# Patient Record
Sex: FEMALE | Race: WHITE
[De-identification: names, ages, dates, MRNs, and addresses within clinical notes are randomized per-mention and may not be internally consistent; named-entity substitution may affect disease eponyms.]

---

## 2006-09-20 VITALS — HEART RATE: 62 BPM

## 2017-01-23 ENCOUNTER — HOSPITAL ENCOUNTER (OUTPATIENT)
Dept: HOSPITAL 19 - COL.LAB | Age: 67
End: 2017-01-23
Attending: ORTHOPAEDIC SURGERY
Payer: COMMERCIAL

## 2017-01-23 DIAGNOSIS — Z01.812: Primary | ICD-10-CM

## 2017-01-23 DIAGNOSIS — M16.12: ICD-10-CM

## 2017-08-18 ENCOUNTER — HOSPITAL ENCOUNTER (INPATIENT)
Dept: HOSPITAL 19 - COL.ER | Age: 67
LOS: 4 days | Discharge: HOME | DRG: 189 | End: 2017-08-22
Attending: FAMILY MEDICINE | Admitting: FAMILY MEDICINE
Payer: COMMERCIAL

## 2017-08-18 VITALS — BODY MASS INDEX: 19.14 KG/M2 | HEIGHT: 65 IN | WEIGHT: 114.86 LBS

## 2017-08-18 VITALS — DIASTOLIC BLOOD PRESSURE: 59 MMHG | SYSTOLIC BLOOD PRESSURE: 142 MMHG | TEMPERATURE: 99.9 F | HEART RATE: 101 BPM

## 2017-08-18 VITALS — DIASTOLIC BLOOD PRESSURE: 68 MMHG | HEART RATE: 98 BPM | TEMPERATURE: 101.7 F | SYSTOLIC BLOOD PRESSURE: 137 MMHG

## 2017-08-18 VITALS — SYSTOLIC BLOOD PRESSURE: 136 MMHG | HEART RATE: 105 BPM | DIASTOLIC BLOOD PRESSURE: 58 MMHG | TEMPERATURE: 101.1 F

## 2017-08-18 DIAGNOSIS — F17.210: ICD-10-CM

## 2017-08-18 DIAGNOSIS — J96.01: Primary | ICD-10-CM

## 2017-08-18 DIAGNOSIS — F10.239: ICD-10-CM

## 2017-08-18 DIAGNOSIS — E87.3: ICD-10-CM

## 2017-08-18 DIAGNOSIS — R91.8: ICD-10-CM

## 2017-08-18 DIAGNOSIS — J44.1: ICD-10-CM

## 2017-08-18 LAB
ADD PATHOLOGY DIFF REVIEW: NO
ADJUSTED CALCIUM: 9.5 MG/DL (ref 8.4–10.2)
ALBUMIN SERPL-MCNC: 4.3 GM/DL (ref 3.5–5)
ALP SERPL-CCNC: 378 U/L (ref 50–136)
ALT SERPL-CCNC: 35 U/L (ref 9–52)
AMPHET UR-MCNC: NEGATIVE NG/ML
ANION GAP SERPL CALC-SCNC: 12 MMOL/L (ref 7–16)
ARTERIAL PATENCY WRIST A: NO
ATS?: YES
BARBITURATES UR SCN-MCNC: NEGATIVE NG/ML
BASE EXCESS BLDA CALC-SCNC: -2 MMOL/L (ref -2–2)
BENZODIAZ UR-MCNC: POSITIVE NG/ML
BILIRUB SERPL-MCNC: 0.6 MG/DL (ref 0–1)
BUN SERPL-MCNC: 9 MG/DL (ref 7–17)
BUPRENORPHINE UR-MCNC: NEGATIVE NG/ML
CALCIUM SERPL-MCNC: 9.7 MG/DL (ref 8.4–10.2)
CHLORIDE SERPL-SCNC: 105 MMOL/L (ref 98–107)
CO2 BLDA-SCNC: 21.3 MMOL/L
CO2 SERPL-SCNC: 19 MMOL/L (ref 22–30)
CREAT SERPL-SCNC: 0.73 MG/DL (ref 0.52–1.25)
CRP SERPL-MCNC: 2.1 MG/DL (ref 0–0.9)
EOSINOPHIL NFR BLD: 5 % (ref 0–4)
ERYTHROCYTE [DISTWIDTH] IN BLOOD BY AUTOMATED COUNT: 15.4 % (ref 11.5–14.5)
GLUCOSE SERPL-MCNC: 111 MG/DL (ref 74–106)
HCO3 BLDA-SCNC: 20.5 MEQ/L (ref 22–26)
HCT VFR BLD AUTO: 39.8 % (ref 37–47)
HGB BLD-MCNC: 13.3 G/DL (ref 12.5–16)
INHALED O2 CONCENTRATION: 21 %
KETONES UR STRIP.AUTO-MCNC: (no result) MG/DL
MCH RBC QN AUTO: 30 PG (ref 27–31)
MCHC RBC AUTO-ENTMCNC: 33 G/DL (ref 33–37)
MCV RBC AUTO: 91 FL (ref 80–100)
METHADONE UR-MCNC: NEGATIVE NG/ML
MONOCYTES NFR BLD: 12 % (ref 1.7–9.3)
NEUTS BAND NFR BLD: 2 % (ref 0–10)
NEUTS SEG NFR BLD MANUAL: 58 % (ref 42–75.2)
OPIATES UR-MCNC: NEGATIVE NG/ML
OXYCODONE UR CFM-MCNC: NEGATIVE NG/ML
OXYHGB MFR BLD: 90 %
PCP UR-MCNC: NEGATIVE NG/ML
PH UR STRIP.AUTO: 6 [PH] (ref 5–8)
PLATELET # BLD AUTO: 374 K/MM3 (ref 130–400)
PLATELET BLD QL SMEAR: NORMAL
PMV BLD AUTO: 9.8 FL (ref 7.4–10.4)
PO2 BLDA: 56.7 MMHG (ref 80–100)
POTASSIUM SERPL-SCNC: 4.4 MMOL/L (ref 3.4–5)
PROPOXYPH UR-MCNC: NEGATIVE NG/ML
PROT SERPL-MCNC: 8.3 GM/DL (ref 6.4–8.2)
RBC # BLD AUTO: 4.4 M/MM3 (ref 4.1–5.3)
RBC # UR STRIP.AUTO: (no result) /UL
RBC # UR: (no result) /HPF
SAO2 % BLDA: 90.9 % (ref 92–100)
SODIUM SERPL-SCNC: 136 MMOL/L (ref 137–145)
SP GR UR STRIP.AUTO: 1.03 (ref 1–1.03)
THC CANNABINOIDS URINE: NEGATIVE NG/ML
TOTAL CELLS COUNTED BLD: 100
WBC # BLD AUTO: 12.8 K/MM3 (ref 4.8–10.8)
WBC #/AREA URNS HPF: (no result) /HPF

## 2017-08-18 PROCEDURE — A9585 GADOBUTROL INJECTION: HCPCS

## 2017-08-19 VITALS — SYSTOLIC BLOOD PRESSURE: 113 MMHG | HEART RATE: 85 BPM | DIASTOLIC BLOOD PRESSURE: 52 MMHG | TEMPERATURE: 98.8 F

## 2017-08-19 VITALS — SYSTOLIC BLOOD PRESSURE: 133 MMHG | DIASTOLIC BLOOD PRESSURE: 60 MMHG | HEART RATE: 101 BPM

## 2017-08-19 VITALS — SYSTOLIC BLOOD PRESSURE: 108 MMHG | HEART RATE: 93 BPM | TEMPERATURE: 99.9 F | DIASTOLIC BLOOD PRESSURE: 60 MMHG

## 2017-08-19 VITALS — TEMPERATURE: 101.4 F | SYSTOLIC BLOOD PRESSURE: 111 MMHG | HEART RATE: 83 BPM | DIASTOLIC BLOOD PRESSURE: 47 MMHG

## 2017-08-19 VITALS — HEART RATE: 74 BPM | TEMPERATURE: 97.8 F | DIASTOLIC BLOOD PRESSURE: 58 MMHG | SYSTOLIC BLOOD PRESSURE: 121 MMHG

## 2017-08-19 VITALS — DIASTOLIC BLOOD PRESSURE: 57 MMHG | TEMPERATURE: 98.6 F | SYSTOLIC BLOOD PRESSURE: 122 MMHG | HEART RATE: 86 BPM

## 2017-08-19 VITALS — SYSTOLIC BLOOD PRESSURE: 118 MMHG | HEART RATE: 73 BPM | DIASTOLIC BLOOD PRESSURE: 56 MMHG | TEMPERATURE: 98.5 F

## 2017-08-19 LAB
ADJUSTED CALCIUM: 9.3 MG/DL (ref 8.4–10.2)
ALBUMIN SERPL-MCNC: 3.8 GM/DL (ref 3.5–5)
ALP SERPL-CCNC: 259 U/L (ref 50–136)
ALT SERPL-CCNC: 31 U/L (ref 9–52)
ANION GAP SERPL CALC-SCNC: 12 MMOL/L (ref 7–16)
BILIRUB SERPL-MCNC: 0.5 MG/DL (ref 0–1)
BUN SERPL-MCNC: 11 MG/DL (ref 7–17)
CALCIUM SERPL-MCNC: 9.1 MG/DL (ref 8.4–10.2)
CHLORIDE SERPL-SCNC: 106 MMOL/L (ref 98–107)
CO2 SERPL-SCNC: 21 MMOL/L (ref 22–30)
CREAT SERPL-SCNC: 0.73 MG/DL (ref 0.52–1.25)
GLUCOSE SERPL-MCNC: 141 MG/DL (ref 74–106)
MAGNESIUM SERPL-MCNC: 2.1 MG/DL (ref 1.6–2.3)
POTASSIUM SERPL-SCNC: 4 MMOL/L (ref 3.4–5)
PROT SERPL-MCNC: 7.3 GM/DL (ref 6.4–8.2)
SODIUM SERPL-SCNC: 138 MMOL/L (ref 137–145)

## 2017-08-20 VITALS — HEART RATE: 77 BPM | SYSTOLIC BLOOD PRESSURE: 110 MMHG | TEMPERATURE: 97.5 F | DIASTOLIC BLOOD PRESSURE: 47 MMHG

## 2017-08-20 VITALS — DIASTOLIC BLOOD PRESSURE: 58 MMHG | SYSTOLIC BLOOD PRESSURE: 120 MMHG | TEMPERATURE: 97.8 F | HEART RATE: 72 BPM

## 2017-08-20 VITALS — TEMPERATURE: 97.5 F | SYSTOLIC BLOOD PRESSURE: 117 MMHG | HEART RATE: 73 BPM | DIASTOLIC BLOOD PRESSURE: 52 MMHG

## 2017-08-20 VITALS — TEMPERATURE: 98.8 F | HEART RATE: 72 BPM | SYSTOLIC BLOOD PRESSURE: 143 MMHG | DIASTOLIC BLOOD PRESSURE: 61 MMHG

## 2017-08-20 VITALS — SYSTOLIC BLOOD PRESSURE: 124 MMHG | TEMPERATURE: 97.8 F | HEART RATE: 65 BPM | DIASTOLIC BLOOD PRESSURE: 59 MMHG

## 2017-08-20 VITALS — SYSTOLIC BLOOD PRESSURE: 117 MMHG | TEMPERATURE: 97.6 F | HEART RATE: 72 BPM | DIASTOLIC BLOOD PRESSURE: 60 MMHG

## 2017-08-20 VITALS — TEMPERATURE: 97.8 F | DIASTOLIC BLOOD PRESSURE: 59 MMHG | HEART RATE: 65 BPM | SYSTOLIC BLOOD PRESSURE: 114 MMHG

## 2017-08-20 LAB
ADD PATHOLOGY DIFF REVIEW: NO
ERYTHROCYTE [DISTWIDTH] IN BLOOD BY AUTOMATED COUNT: 16.1 % (ref 11.5–14.5)
HCT VFR BLD AUTO: 32 % (ref 37–47)
HGB BLD-MCNC: 10.5 G/DL (ref 12.5–16)
MCH RBC QN AUTO: 30 PG (ref 27–31)
MCHC RBC AUTO-ENTMCNC: 33 G/DL (ref 33–37)
MCV RBC AUTO: 91 FL (ref 80–100)
MONOCYTES NFR BLD: 1 % (ref 1.7–9.3)
MYELOCYTES NFR BLD MANUAL: 2 % (ref 0–0)
NEUTS BAND NFR BLD: 26 % (ref 0–10)
NEUTS SEG NFR BLD MANUAL: 61 % (ref 42–75.2)
PLATELET # BLD AUTO: 264 K/MM3 (ref 130–400)
PLATELET BLD QL SMEAR: NORMAL
PMV BLD AUTO: 10 FL (ref 7.4–10.4)
RBC # BLD AUTO: 3.53 M/MM3 (ref 4.1–5.3)
TOTAL CELLS COUNTED BLD: 100
WBC # BLD AUTO: 12.4 K/MM3 (ref 4.8–10.8)

## 2017-08-21 VITALS — SYSTOLIC BLOOD PRESSURE: 108 MMHG | HEART RATE: 69 BPM | TEMPERATURE: 97.5 F | DIASTOLIC BLOOD PRESSURE: 52 MMHG

## 2017-08-21 VITALS — HEART RATE: 57 BPM | DIASTOLIC BLOOD PRESSURE: 60 MMHG | SYSTOLIC BLOOD PRESSURE: 128 MMHG | TEMPERATURE: 97.6 F

## 2017-08-21 VITALS — HEART RATE: 72 BPM | TEMPERATURE: 97.6 F | SYSTOLIC BLOOD PRESSURE: 117 MMHG | DIASTOLIC BLOOD PRESSURE: 60 MMHG

## 2017-08-21 VITALS — DIASTOLIC BLOOD PRESSURE: 61 MMHG | HEART RATE: 58 BPM | SYSTOLIC BLOOD PRESSURE: 134 MMHG | TEMPERATURE: 97.2 F

## 2017-08-21 VITALS — DIASTOLIC BLOOD PRESSURE: 73 MMHG | HEART RATE: 87 BPM | SYSTOLIC BLOOD PRESSURE: 110 MMHG | TEMPERATURE: 97 F

## 2017-08-21 VITALS — HEART RATE: 79 BPM | TEMPERATURE: 97.9 F | DIASTOLIC BLOOD PRESSURE: 61 MMHG | SYSTOLIC BLOOD PRESSURE: 119 MMHG

## 2017-08-21 VITALS — SYSTOLIC BLOOD PRESSURE: 107 MMHG | DIASTOLIC BLOOD PRESSURE: 55 MMHG | TEMPERATURE: 97.4 F | HEART RATE: 76 BPM

## 2017-08-21 VITALS — HEART RATE: 58 BPM | DIASTOLIC BLOOD PRESSURE: 61 MMHG | SYSTOLIC BLOOD PRESSURE: 134 MMHG | TEMPERATURE: 97.2 F

## 2017-08-21 VITALS — DIASTOLIC BLOOD PRESSURE: 58 MMHG | SYSTOLIC BLOOD PRESSURE: 117 MMHG | TEMPERATURE: 97.8 F | HEART RATE: 76 BPM

## 2017-08-21 VITALS — TEMPERATURE: 97.4 F | DIASTOLIC BLOOD PRESSURE: 63 MMHG | HEART RATE: 58 BPM | SYSTOLIC BLOOD PRESSURE: 128 MMHG

## 2017-08-21 VITALS — TEMPERATURE: 97.4 F | DIASTOLIC BLOOD PRESSURE: 55 MMHG | HEART RATE: 76 BPM | SYSTOLIC BLOOD PRESSURE: 107 MMHG

## 2017-08-21 VITALS — TEMPERATURE: 97.8 F

## 2017-08-21 VITALS — SYSTOLIC BLOOD PRESSURE: 111 MMHG | DIASTOLIC BLOOD PRESSURE: 53 MMHG | HEART RATE: 72 BPM

## 2017-08-22 VITALS — TEMPERATURE: 97.5 F | DIASTOLIC BLOOD PRESSURE: 61 MMHG | SYSTOLIC BLOOD PRESSURE: 126 MMHG | HEART RATE: 64 BPM

## 2017-08-22 VITALS — HEART RATE: 74 BPM | SYSTOLIC BLOOD PRESSURE: 124 MMHG | DIASTOLIC BLOOD PRESSURE: 84 MMHG | TEMPERATURE: 97.7 F

## 2017-08-22 VITALS — DIASTOLIC BLOOD PRESSURE: 57 MMHG | HEART RATE: 62 BPM | SYSTOLIC BLOOD PRESSURE: 129 MMHG | TEMPERATURE: 97 F

## 2017-08-22 VITALS — TEMPERATURE: 96.9 F | HEART RATE: 62 BPM | DIASTOLIC BLOOD PRESSURE: 62 MMHG | SYSTOLIC BLOOD PRESSURE: 123 MMHG

## 2017-08-22 VITALS — SYSTOLIC BLOOD PRESSURE: 122 MMHG | DIASTOLIC BLOOD PRESSURE: 49 MMHG | TEMPERATURE: 97.9 F | HEART RATE: 76 BPM

## 2017-08-22 VITALS — TEMPERATURE: 97 F | SYSTOLIC BLOOD PRESSURE: 124 MMHG | HEART RATE: 63 BPM | DIASTOLIC BLOOD PRESSURE: 58 MMHG

## 2017-08-22 VITALS — SYSTOLIC BLOOD PRESSURE: 125 MMHG | TEMPERATURE: 98.2 F | DIASTOLIC BLOOD PRESSURE: 61 MMHG | HEART RATE: 64 BPM

## 2017-08-22 LAB
ANION GAP SERPL CALC-SCNC: 4 MMOL/L (ref 7–16)
APPEARANCE CSF: CLEAR
BASOPHILS # BLD: 0 10*3/UL (ref 0–0.2)
BASOPHILS NFR BLD AUTO: 0.1 % (ref 0–2)
BUN SERPL-MCNC: 12 MG/DL (ref 7–17)
CALCIUM SERPL-MCNC: 8.7 MG/DL (ref 8.4–10.2)
CEREBROSPINAL: (no result)
CHLORIDE SERPL-SCNC: 104 MMOL/L (ref 98–107)
CO2 SERPL-SCNC: 25 MMOL/L (ref 22–30)
COLOR CSF: COLORLESS
CREAT SERPL-SCNC: 0.64 MG/DL (ref 0.52–1.25)
EOSINOPHIL # BLD: 0.2 10*3/UL (ref 0–0.7)
EOSINOPHIL NFR BLD: 1.9 % (ref 0–4)
ERYTHROCYTE [DISTWIDTH] IN BLOOD BY AUTOMATED COUNT: 16.1 % (ref 11.5–14.5)
GLUCOSE SERPL-MCNC: 80 MG/DL (ref 74–106)
GRANULOCYTES # BLD AUTO: 57.8 % (ref 42.2–75.2)
HCT VFR BLD AUTO: 30 % (ref 37–47)
HGB BLD-MCNC: 9.8 G/DL (ref 12.5–16)
INR BLD: 1 (ref 0.8–3)
LYMPHOCYTES # BLD: 2.5 10*3/UL (ref 1.2–3.4)
LYMPHOCYTES NFR BLD: 28.3 % (ref 20–51)
MCH RBC QN AUTO: 30 PG (ref 27–31)
MCHC RBC AUTO-ENTMCNC: 33 G/DL (ref 33–37)
MCV RBC AUTO: 90 FL (ref 80–100)
MONOCYTES # BLD: 0.9 10*3/UL (ref 0.1–0.6)
MONOCYTES NFR BLD AUTO: 10.2 % (ref 1.7–9.3)
NEUTROPHILS # BLD: 5.2 10*3/UL (ref 1.4–6.5)
PLATELET # BLD AUTO: 306 K/MM3 (ref 130–400)
PMV BLD AUTO: 10.3 FL (ref 7.4–10.4)
POTASSIUM SERPL-SCNC: 3.6 MMOL/L (ref 3.4–5)
PROTHROMBIN TIME: 11.1 SECONDS (ref 9.7–12.8)
RBC # BLD AUTO: 3.32 M/MM3 (ref 4.1–5.3)
SODIUM SERPL-SCNC: 132 MMOL/L (ref 137–145)
WBC # BLD AUTO: 8.9 K/MM3 (ref 4.8–10.8)

## 2017-08-22 PROCEDURE — 009U3ZX DRAINAGE OF SPINAL CANAL, PERCUTANEOUS APPROACH, DIAGNOSTIC: ICD-10-PCS | Performed by: RADIOLOGY

## 2017-09-05 ENCOUNTER — HOSPITAL ENCOUNTER (OUTPATIENT)
Dept: HOSPITAL 19 - COL.RAD | Age: 67
Discharge: HOME | End: 2017-09-05
Attending: PHYSICIAN ASSISTANT
Payer: COMMERCIAL

## 2017-09-05 VITALS — HEART RATE: 76 BPM | DIASTOLIC BLOOD PRESSURE: 80 MMHG | SYSTOLIC BLOOD PRESSURE: 114 MMHG

## 2017-09-05 VITALS — SYSTOLIC BLOOD PRESSURE: 120 MMHG | HEART RATE: 60 BPM | DIASTOLIC BLOOD PRESSURE: 62 MMHG

## 2017-09-05 VITALS — HEART RATE: 63 BPM | SYSTOLIC BLOOD PRESSURE: 136 MMHG | DIASTOLIC BLOOD PRESSURE: 53 MMHG

## 2017-09-05 VITALS — DIASTOLIC BLOOD PRESSURE: 71 MMHG | SYSTOLIC BLOOD PRESSURE: 126 MMHG | HEART RATE: 74 BPM

## 2017-09-05 VITALS — SYSTOLIC BLOOD PRESSURE: 117 MMHG | HEART RATE: 72 BPM | DIASTOLIC BLOOD PRESSURE: 65 MMHG

## 2017-09-05 VITALS — HEART RATE: 60 BPM | SYSTOLIC BLOOD PRESSURE: 130 MMHG | DIASTOLIC BLOOD PRESSURE: 69 MMHG

## 2017-09-05 VITALS — SYSTOLIC BLOOD PRESSURE: 122 MMHG | HEART RATE: 65 BPM | DIASTOLIC BLOOD PRESSURE: 65 MMHG

## 2017-09-05 VITALS — HEART RATE: 72 BPM | DIASTOLIC BLOOD PRESSURE: 69 MMHG | SYSTOLIC BLOOD PRESSURE: 116 MMHG

## 2017-09-05 VITALS — DIASTOLIC BLOOD PRESSURE: 69 MMHG | SYSTOLIC BLOOD PRESSURE: 128 MMHG | HEART RATE: 73 BPM

## 2017-09-05 VITALS — DIASTOLIC BLOOD PRESSURE: 69 MMHG | HEART RATE: 74 BPM | SYSTOLIC BLOOD PRESSURE: 112 MMHG

## 2017-09-05 VITALS — DIASTOLIC BLOOD PRESSURE: 66 MMHG | HEART RATE: 71 BPM | SYSTOLIC BLOOD PRESSURE: 130 MMHG

## 2017-09-05 VITALS — DIASTOLIC BLOOD PRESSURE: 57 MMHG | HEART RATE: 72 BPM | SYSTOLIC BLOOD PRESSURE: 108 MMHG

## 2017-09-05 VITALS — WEIGHT: 110.89 LBS | BODY MASS INDEX: 18.48 KG/M2 | HEIGHT: 65 IN

## 2017-09-05 VITALS — DIASTOLIC BLOOD PRESSURE: 75 MMHG | HEART RATE: 76 BPM | SYSTOLIC BLOOD PRESSURE: 121 MMHG

## 2017-09-05 VITALS — SYSTOLIC BLOOD PRESSURE: 120 MMHG | DIASTOLIC BLOOD PRESSURE: 67 MMHG | HEART RATE: 71 BPM

## 2017-09-05 VITALS — DIASTOLIC BLOOD PRESSURE: 67 MMHG | HEART RATE: 62 BPM | SYSTOLIC BLOOD PRESSURE: 116 MMHG

## 2017-09-05 VITALS — DIASTOLIC BLOOD PRESSURE: 72 MMHG | HEART RATE: 65 BPM | SYSTOLIC BLOOD PRESSURE: 119 MMHG

## 2017-09-05 VITALS — HEART RATE: 72 BPM | DIASTOLIC BLOOD PRESSURE: 66 MMHG | SYSTOLIC BLOOD PRESSURE: 118 MMHG

## 2017-09-05 DIAGNOSIS — F17.210: ICD-10-CM

## 2017-09-05 DIAGNOSIS — J84.10: ICD-10-CM

## 2017-09-05 DIAGNOSIS — J43.9: Primary | ICD-10-CM

## 2017-09-06 ENCOUNTER — HOSPITAL ENCOUNTER (INPATIENT)
Dept: HOSPITAL 19 - COL.ER | Age: 67
LOS: 5 days | Discharge: HOME HEALTH | DRG: 897 | End: 2017-09-11
Attending: INTERNAL MEDICINE | Admitting: INTERNAL MEDICINE
Payer: COMMERCIAL

## 2017-09-06 VITALS — DIASTOLIC BLOOD PRESSURE: 56 MMHG | SYSTOLIC BLOOD PRESSURE: 129 MMHG | HEART RATE: 70 BPM | TEMPERATURE: 99 F

## 2017-09-06 VITALS — WEIGHT: 111.33 LBS | BODY MASS INDEX: 18.55 KG/M2 | HEIGHT: 65 IN

## 2017-09-06 DIAGNOSIS — E87.3: ICD-10-CM

## 2017-09-06 DIAGNOSIS — G43.719: ICD-10-CM

## 2017-09-06 DIAGNOSIS — F32.9: ICD-10-CM

## 2017-09-06 DIAGNOSIS — F10.231: Primary | ICD-10-CM

## 2017-09-06 DIAGNOSIS — E46: ICD-10-CM

## 2017-09-06 DIAGNOSIS — J44.9: ICD-10-CM

## 2017-09-06 DIAGNOSIS — G44.1: ICD-10-CM

## 2017-09-06 DIAGNOSIS — F17.210: ICD-10-CM

## 2017-09-06 DIAGNOSIS — I67.1: ICD-10-CM

## 2017-09-06 LAB
ADJUSTED CALCIUM: 9.6 MG/DL (ref 8.4–10.2)
ALBUMIN SERPL-MCNC: 4 GM/DL (ref 3.5–5)
ALP SERPL-CCNC: 93 U/L (ref 50–136)
ALT SERPL-CCNC: 25 U/L (ref 9–52)
AMPHET UR-MCNC: NEGATIVE NG/ML
ANION GAP SERPL CALC-SCNC: 9 MMOL/L (ref 7–16)
ARTERIAL PATENCY WRIST A: (no result)
ARTERIAL PATENCY WRIST A: YES
ATS?: YES
BARBITURATES UR SCN-MCNC: NEGATIVE NG/ML
BASE EXCESS BLDA CALC-SCNC: -0.2 MMOL/L (ref -2–2)
BASOPHILS # BLD: 0.1 10*3/UL (ref 0–0.2)
BASOPHILS NFR BLD AUTO: 0.6 % (ref 0–2)
BENZODIAZ UR-MCNC: NEGATIVE NG/ML
BILIRUB SERPL-MCNC: 0.9 MG/DL (ref 0–1)
BUN SERPL-MCNC: 11 MG/DL (ref 7–17)
BUPRENORPHINE UR-MCNC: NEGATIVE NG/ML
CALCIUM SERPL-MCNC: 9.6 MG/DL (ref 8.4–10.2)
CHLORIDE SERPL-SCNC: 103 MMOL/L (ref 98–107)
CO2 BLDA-SCNC: 23.3 MMOL/L
CO2 SERPL-SCNC: 21 MMOL/L (ref 22–30)
CREAT SERPL-SCNC: 0.68 MG/DL (ref 0.52–1.25)
CRP SERPL-MCNC: 0.8 MG/DL (ref 0–0.9)
EOSINOPHIL # BLD: 0.1 10*3/UL (ref 0–0.7)
EOSINOPHIL NFR BLD: 1.1 % (ref 0–4)
ERYTHROCYTE [DISTWIDTH] IN BLOOD BY AUTOMATED COUNT: 15.9 % (ref 11.5–14.5)
GLUCOSE SERPL-MCNC: 123 MG/DL (ref 74–106)
GRAN CASTS #/AREA URNS LPF: (no result) /LPF
GRANULOCYTES # BLD AUTO: 74.3 % (ref 42.2–75.2)
HCO3 BLDA-SCNC: 22.4 MEQ/L (ref 22–26)
HCT VFR BLD AUTO: 40.5 % (ref 37–47)
HGB BLD-MCNC: 13.2 G/DL (ref 12.5–16)
HYALINE CASTS #/AREA URNS LPF: (no result) /LPF
INHALED O2 CONCENTRATION: 21 %
INR BLD: 0.9 (ref 0.8–3)
KETONES UR STRIP.AUTO-MCNC: (no result) MG/DL
LYMPHOCYTES # BLD: 1.5 10*3/UL (ref 1.2–3.4)
LYMPHOCYTES NFR BLD: 17.9 % (ref 20–51)
MCH RBC QN AUTO: 30 PG (ref 27–31)
MCHC RBC AUTO-ENTMCNC: 33 G/DL (ref 33–37)
MCV RBC AUTO: 91 FL (ref 80–100)
METHADONE UR-MCNC: NEGATIVE NG/ML
MONOCYTES # BLD: 0.5 10*3/UL (ref 0.1–0.6)
MONOCYTES NFR BLD AUTO: 5.5 % (ref 1.7–9.3)
NEUTROPHILS # BLD: 6.2 10*3/UL (ref 1.4–6.5)
OPIATES UR-MCNC: NEGATIVE NG/ML
OXYCODONE UR CFM-MCNC: NEGATIVE NG/ML
OXYHGB MFR BLD: 94.8 %
PCP UR-MCNC: NEGATIVE NG/ML
PH BLDA: 7.48 C (ref 7.35–7.45)
PH UR STRIP.AUTO: 6 [PH] (ref 5–8)
PLATELET # BLD AUTO: 262 K/MM3 (ref 130–400)
PMV BLD AUTO: 11.5 FL (ref 7.4–10.4)
PO2 BLDA: 79.8 MMHG (ref 80–100)
PO2 BLDA: 79.8 MM[HG] (ref 80–100)
POTASSIUM SERPL-SCNC: 4.6 MMOL/L (ref 3.4–5)
PROPOXYPH UR-MCNC: NEGATIVE NG/ML
PROT SERPL-MCNC: 7.7 GM/DL (ref 6.4–8.2)
PROTHROMBIN TIME: 10.4 SECONDS (ref 9.7–12.8)
RBC # BLD AUTO: 4.43 M/MM3 (ref 4.1–5.3)
RBC # UR: (no result) /HPF
SAO2 % BLDA: 96 % (ref 92–100)
SODIUM SERPL-SCNC: 133 MMOL/L (ref 137–145)
SP GR UR STRIP.AUTO: 1.02 (ref 1–1.03)
THC CANNABINOIDS URINE: NEGATIVE NG/ML
TROPONIN I SERPL-MCNC: < 0.012 NG/ML (ref 0–0.03)
TSH SERPL DL<=0.005 MIU/L-ACNC: 0.95 UIU/ML (ref 0.47–4.68)
WBC # BLD AUTO: 8.4 K/MM3 (ref 4.8–10.8)
WBC #/AREA URNS HPF: (no result) /HPF

## 2017-09-06 PROCEDURE — A9585 GADOBUTROL INJECTION: HCPCS

## 2017-09-07 VITALS — DIASTOLIC BLOOD PRESSURE: 61 MMHG | HEART RATE: 66 BPM | SYSTOLIC BLOOD PRESSURE: 105 MMHG | TEMPERATURE: 97.5 F

## 2017-09-07 VITALS — TEMPERATURE: 97.8 F | SYSTOLIC BLOOD PRESSURE: 114 MMHG | HEART RATE: 74 BPM | DIASTOLIC BLOOD PRESSURE: 54 MMHG

## 2017-09-07 VITALS — DIASTOLIC BLOOD PRESSURE: 52 MMHG | HEART RATE: 63 BPM | TEMPERATURE: 98.5 F | SYSTOLIC BLOOD PRESSURE: 100 MMHG

## 2017-09-07 VITALS — HEART RATE: 86 BPM | TEMPERATURE: 97.5 F | DIASTOLIC BLOOD PRESSURE: 46 MMHG | SYSTOLIC BLOOD PRESSURE: 97 MMHG

## 2017-09-07 VITALS — TEMPERATURE: 98.1 F | SYSTOLIC BLOOD PRESSURE: 107 MMHG | DIASTOLIC BLOOD PRESSURE: 59 MMHG | HEART RATE: 67 BPM

## 2017-09-07 VITALS — HEART RATE: 82 BPM | DIASTOLIC BLOOD PRESSURE: 51 MMHG | SYSTOLIC BLOOD PRESSURE: 107 MMHG

## 2017-09-07 VITALS — HEART RATE: 62 BPM | SYSTOLIC BLOOD PRESSURE: 101 MMHG | TEMPERATURE: 97.9 F | DIASTOLIC BLOOD PRESSURE: 55 MMHG

## 2017-09-07 LAB — MAGNESIUM SERPL-MCNC: 1.8 MG/DL (ref 1.6–2.3)

## 2017-09-08 VITALS — OXYGEN SATURATION: 97 %

## 2017-09-08 VITALS — OXYGEN SATURATION: 98 %

## 2017-09-08 VITALS — OXYGEN SATURATION: 96 %

## 2017-09-08 VITALS — OXYGEN SATURATION: 99 %

## 2017-09-08 VITALS
DIASTOLIC BLOOD PRESSURE: 68 MMHG | TEMPERATURE: 97.4 F | SYSTOLIC BLOOD PRESSURE: 108 MMHG | HEART RATE: 71 BPM | OXYGEN SATURATION: 97 %

## 2017-09-08 VITALS — OXYGEN SATURATION: 100 %

## 2017-09-08 VITALS
SYSTOLIC BLOOD PRESSURE: 132 MMHG | HEART RATE: 80 BPM | OXYGEN SATURATION: 96 % | DIASTOLIC BLOOD PRESSURE: 72 MMHG | TEMPERATURE: 97.9 F

## 2017-09-08 VITALS — HEART RATE: 78 BPM | DIASTOLIC BLOOD PRESSURE: 65 MMHG | TEMPERATURE: 98.2 F | SYSTOLIC BLOOD PRESSURE: 134 MMHG

## 2017-09-08 VITALS — HEART RATE: 69 BPM | TEMPERATURE: 98 F | DIASTOLIC BLOOD PRESSURE: 64 MMHG | SYSTOLIC BLOOD PRESSURE: 125 MMHG

## 2017-09-08 VITALS — SYSTOLIC BLOOD PRESSURE: 111 MMHG | DIASTOLIC BLOOD PRESSURE: 60 MMHG | TEMPERATURE: 97.7 F | HEART RATE: 74 BPM

## 2017-09-08 VITALS — OXYGEN SATURATION: 86 %

## 2017-09-08 VITALS — OXYGEN SATURATION: 95 %

## 2017-09-08 VITALS — DIASTOLIC BLOOD PRESSURE: 55 MMHG | SYSTOLIC BLOOD PRESSURE: 118 MMHG | HEART RATE: 67 BPM | TEMPERATURE: 97.6 F

## 2017-09-08 VITALS — SYSTOLIC BLOOD PRESSURE: 125 MMHG | HEART RATE: 76 BPM | DIASTOLIC BLOOD PRESSURE: 57 MMHG | TEMPERATURE: 97.8 F

## 2017-09-08 VITALS — OXYGEN SATURATION: 93 %

## 2017-09-08 VITALS — OXYGEN SATURATION: 68 %

## 2017-09-08 VITALS
TEMPERATURE: 97.7 F | HEART RATE: 74 BPM | OXYGEN SATURATION: 100 % | SYSTOLIC BLOOD PRESSURE: 127 MMHG | DIASTOLIC BLOOD PRESSURE: 69 MMHG

## 2017-09-08 VITALS — DIASTOLIC BLOOD PRESSURE: 51 MMHG | SYSTOLIC BLOOD PRESSURE: 107 MMHG | TEMPERATURE: 97.5 F | HEART RATE: 82 BPM

## 2017-09-08 VITALS — OXYGEN SATURATION: 100 % | HEART RATE: 70 BPM | DIASTOLIC BLOOD PRESSURE: 71 MMHG | SYSTOLIC BLOOD PRESSURE: 127 MMHG

## 2017-09-08 VITALS — OXYGEN SATURATION: 83 %

## 2017-09-08 VITALS — OXYGEN SATURATION: 92 %

## 2017-09-08 LAB
ANION GAP SERPL CALC-SCNC: 8 MMOL/L (ref 7–16)
BASOPHILS # BLD: 0.1 10*3/UL (ref 0–0.2)
BASOPHILS NFR BLD AUTO: 0.8 % (ref 0–2)
BUN SERPL-MCNC: 5 MG/DL (ref 7–17)
CALCIUM SERPL-MCNC: 9.2 MG/DL (ref 8.4–10.2)
CHLORIDE SERPL-SCNC: 113 MMOL/L (ref 98–107)
CHOLEST SPEC-SCNC: 212 MG/DL (ref 120–200)
CO2 SERPL-SCNC: 23 MMOL/L (ref 22–30)
CREAT SERPL-SCNC: 0.64 MG/DL (ref 0.52–1.25)
CRP SERPL-MCNC: < 0.5 MG/DL (ref 0–0.9)
EOSINOPHIL # BLD: 0.7 10*3/UL (ref 0–0.7)
EOSINOPHIL NFR BLD: 7.7 % (ref 0–4)
ERYTHROCYTE [DISTWIDTH] IN BLOOD BY AUTOMATED COUNT: 16.1 % (ref 11.5–14.5)
ERYTHROCYTE [SEDIMENTATION RATE] IN BLOOD: 16 MM/HR (ref 0–30)
GLUCOSE SERPL-MCNC: 78 MG/DL (ref 74–106)
GRANULOCYTES # BLD AUTO: 58.7 % (ref 42.2–75.2)
HCT VFR BLD AUTO: 37.4 % (ref 37–47)
HDLC SERPL-MCNC: 61 MG/DL
HGB BLD-MCNC: 11.9 G/DL (ref 12.5–16)
LDLC SERPL-MCNC: 133 MG/DL
LYMPHOCYTES # BLD: 2.1 10*3/UL (ref 1.2–3.4)
LYMPHOCYTES NFR BLD: 24.4 % (ref 20–51)
MCH RBC QN AUTO: 30 PG (ref 27–31)
MCHC RBC AUTO-ENTMCNC: 32 G/DL (ref 33–37)
MCV RBC AUTO: 94 FL (ref 80–100)
MONOCYTES # BLD: 0.7 10*3/UL (ref 0.1–0.6)
MONOCYTES NFR BLD AUTO: 7.9 % (ref 1.7–9.3)
NEUTROPHILS # BLD: 4.9 10*3/UL (ref 1.4–6.5)
PLATELET # BLD AUTO: 203 K/MM3 (ref 130–400)
PMV BLD AUTO: 10.6 FL (ref 7.4–10.4)
POTASSIUM SERPL-SCNC: 3.6 MMOL/L (ref 3.4–5)
RBC # BLD AUTO: 4 M/MM3 (ref 4.1–5.3)
SODIUM SERPL-SCNC: 144 MMOL/L (ref 137–145)
TRIGL SERPL-MCNC: 89 MG/DL
VIT B12 SERPL-MCNC: 480 PG/ML (ref 213–816)
WBC # BLD AUTO: 8.4 K/MM3 (ref 4.8–10.8)

## 2017-09-09 VITALS — OXYGEN SATURATION: 98 %

## 2017-09-09 VITALS — TEMPERATURE: 97.7 F | SYSTOLIC BLOOD PRESSURE: 131 MMHG | DIASTOLIC BLOOD PRESSURE: 64 MMHG | HEART RATE: 70 BPM

## 2017-09-09 VITALS — OXYGEN SATURATION: 99 %

## 2017-09-09 VITALS — HEART RATE: 63 BPM | SYSTOLIC BLOOD PRESSURE: 146 MMHG | DIASTOLIC BLOOD PRESSURE: 63 MMHG

## 2017-09-09 VITALS — TEMPERATURE: 97.8 F | HEART RATE: 66 BPM | DIASTOLIC BLOOD PRESSURE: 63 MMHG | SYSTOLIC BLOOD PRESSURE: 137 MMHG

## 2017-09-09 VITALS — HEART RATE: 59 BPM | SYSTOLIC BLOOD PRESSURE: 126 MMHG | TEMPERATURE: 98.2 F | DIASTOLIC BLOOD PRESSURE: 68 MMHG

## 2017-09-09 VITALS — SYSTOLIC BLOOD PRESSURE: 126 MMHG | HEART RATE: 60 BPM | TEMPERATURE: 97.6 F | DIASTOLIC BLOOD PRESSURE: 71 MMHG

## 2017-09-09 VITALS — TEMPERATURE: 98 F | SYSTOLIC BLOOD PRESSURE: 135 MMHG | HEART RATE: 64 BPM | DIASTOLIC BLOOD PRESSURE: 74 MMHG

## 2017-09-09 VITALS — OXYGEN SATURATION: 97 %

## 2017-09-09 VITALS — HEART RATE: 77 BPM | DIASTOLIC BLOOD PRESSURE: 64 MMHG | SYSTOLIC BLOOD PRESSURE: 118 MMHG | TEMPERATURE: 98.9 F

## 2017-09-09 LAB — COPPER SERPL-MCNC: 1.15 MCG/ML

## 2017-09-10 VITALS — DIASTOLIC BLOOD PRESSURE: 67 MMHG | HEART RATE: 75 BPM | SYSTOLIC BLOOD PRESSURE: 127 MMHG | TEMPERATURE: 98.2 F

## 2017-09-10 VITALS — DIASTOLIC BLOOD PRESSURE: 62 MMHG | SYSTOLIC BLOOD PRESSURE: 112 MMHG | TEMPERATURE: 97.8 F | HEART RATE: 80 BPM

## 2017-09-10 VITALS — SYSTOLIC BLOOD PRESSURE: 105 MMHG | DIASTOLIC BLOOD PRESSURE: 51 MMHG | TEMPERATURE: 97.8 F | HEART RATE: 85 BPM

## 2017-09-10 VITALS — SYSTOLIC BLOOD PRESSURE: 121 MMHG | HEART RATE: 82 BPM | TEMPERATURE: 98.4 F | DIASTOLIC BLOOD PRESSURE: 62 MMHG

## 2017-09-10 VITALS — SYSTOLIC BLOOD PRESSURE: 118 MMHG | HEART RATE: 82 BPM | TEMPERATURE: 97.8 F | DIASTOLIC BLOOD PRESSURE: 57 MMHG

## 2017-09-10 VITALS — TEMPERATURE: 98.9 F | HEART RATE: 67 BPM | DIASTOLIC BLOOD PRESSURE: 62 MMHG | SYSTOLIC BLOOD PRESSURE: 113 MMHG

## 2017-09-10 VITALS — TEMPERATURE: 98.8 F | HEART RATE: 71 BPM | SYSTOLIC BLOOD PRESSURE: 128 MMHG | DIASTOLIC BLOOD PRESSURE: 68 MMHG

## 2017-09-10 VITALS — SYSTOLIC BLOOD PRESSURE: 123 MMHG | TEMPERATURE: 98.7 F | HEART RATE: 69 BPM | DIASTOLIC BLOOD PRESSURE: 60 MMHG

## 2017-09-10 VITALS — SYSTOLIC BLOOD PRESSURE: 135 MMHG | DIASTOLIC BLOOD PRESSURE: 62 MMHG | HEART RATE: 69 BPM | TEMPERATURE: 98.6 F

## 2017-09-10 VITALS — TEMPERATURE: 98.6 F | DIASTOLIC BLOOD PRESSURE: 61 MMHG | SYSTOLIC BLOOD PRESSURE: 126 MMHG | HEART RATE: 61 BPM

## 2017-09-10 VITALS — TEMPERATURE: 98.6 F | DIASTOLIC BLOOD PRESSURE: 70 MMHG | SYSTOLIC BLOOD PRESSURE: 141 MMHG | HEART RATE: 73 BPM

## 2017-09-10 VITALS — DIASTOLIC BLOOD PRESSURE: 59 MMHG | TEMPERATURE: 98.6 F | HEART RATE: 82 BPM | SYSTOLIC BLOOD PRESSURE: 120 MMHG

## 2017-09-11 VITALS — SYSTOLIC BLOOD PRESSURE: 119 MMHG | HEART RATE: 75 BPM | TEMPERATURE: 98 F | DIASTOLIC BLOOD PRESSURE: 56 MMHG

## 2017-09-11 VITALS — DIASTOLIC BLOOD PRESSURE: 61 MMHG | SYSTOLIC BLOOD PRESSURE: 120 MMHG | HEART RATE: 73 BPM | TEMPERATURE: 98.6 F

## 2017-09-11 VITALS — DIASTOLIC BLOOD PRESSURE: 53 MMHG | TEMPERATURE: 98.3 F | HEART RATE: 67 BPM | SYSTOLIC BLOOD PRESSURE: 121 MMHG

## 2017-09-11 VITALS — HEART RATE: 78 BPM | TEMPERATURE: 97.8 F | SYSTOLIC BLOOD PRESSURE: 122 MMHG | DIASTOLIC BLOOD PRESSURE: 61 MMHG

## 2017-09-11 VITALS — TEMPERATURE: 97.6 F | DIASTOLIC BLOOD PRESSURE: 59 MMHG | SYSTOLIC BLOOD PRESSURE: 119 MMHG | HEART RATE: 70 BPM

## 2017-09-11 VITALS — TEMPERATURE: 98.8 F | HEART RATE: 75 BPM | SYSTOLIC BLOOD PRESSURE: 114 MMHG | DIASTOLIC BLOOD PRESSURE: 62 MMHG

## 2017-09-11 VITALS — DIASTOLIC BLOOD PRESSURE: 53 MMHG | TEMPERATURE: 97.6 F | HEART RATE: 77 BPM | SYSTOLIC BLOOD PRESSURE: 125 MMHG

## 2017-09-12 LAB — TOCOPHEROLS SERPL-MCNC: 10.6 MG/L

## 2017-10-05 ENCOUNTER — HOSPITAL ENCOUNTER (OUTPATIENT)
Dept: HOSPITAL 19 - BHSO | Age: 67
End: 2017-10-05
Attending: PSYCHIATRY & NEUROLOGY
Payer: MEDICARE

## 2017-10-05 DIAGNOSIS — F41.1: Primary | ICD-10-CM

## 2017-10-23 ENCOUNTER — HOSPITAL ENCOUNTER (OUTPATIENT)
Dept: HOSPITAL 19 - MC.RAD | Age: 67
End: 2017-10-23
Attending: FAMILY MEDICINE
Payer: MEDICARE

## 2017-10-23 DIAGNOSIS — Z12.31: Primary | ICD-10-CM

## 2019-02-27 ENCOUNTER — HOSPITAL ENCOUNTER (OUTPATIENT)
Dept: HOSPITAL 19 - MC.RAD | Age: 69
End: 2019-02-27
Payer: MEDICARE

## 2019-02-27 DIAGNOSIS — Z12.31: Primary | ICD-10-CM

## 2019-08-19 ENCOUNTER — HOSPITAL ENCOUNTER (EMERGENCY)
Dept: HOSPITAL 19 - COL.ER | Age: 69
Discharge: HOME | End: 2019-08-19
Payer: MEDICARE

## 2019-08-19 VITALS — HEART RATE: 99 BPM | DIASTOLIC BLOOD PRESSURE: 68 MMHG | SYSTOLIC BLOOD PRESSURE: 116 MMHG

## 2019-08-19 VITALS — WEIGHT: 130.29 LBS | BODY MASS INDEX: 20.94 KG/M2 | HEIGHT: 65.98 IN

## 2019-08-19 VITALS — TEMPERATURE: 98.7 F

## 2019-08-19 DIAGNOSIS — K85.90: Primary | ICD-10-CM

## 2019-08-19 DIAGNOSIS — Z79.51: ICD-10-CM

## 2019-08-19 DIAGNOSIS — K21.9: ICD-10-CM

## 2019-08-19 DIAGNOSIS — F17.210: ICD-10-CM

## 2019-08-19 DIAGNOSIS — F32.9: ICD-10-CM

## 2019-08-19 DIAGNOSIS — J44.9: ICD-10-CM

## 2019-08-19 LAB
ALBUMIN SERPL-MCNC: 3.6 GM/DL (ref 3.5–5)
ALP SERPL-CCNC: 105 U/L (ref 50–136)
ALT SERPL-CCNC: 7 U/L (ref 9–52)
AMYLASE SERPL-CCNC: 472 U/L (ref 30–110)
ANION GAP SERPL CALC-SCNC: 8 MMOL/L (ref 7–16)
AST SERPL-CCNC: 22 U/L (ref 15–37)
BASOPHILS # BLD: 0 10*3/UL (ref 0–0.2)
BASOPHILS NFR BLD AUTO: 0.3 % (ref 0–2)
BILIRUB SERPL-MCNC: 0.4 MG/DL (ref 0–1)
BUN SERPL-MCNC: 6 MG/DL (ref 7–17)
CALCIUM SERPL-MCNC: 8.8 MG/DL (ref 8.4–10.2)
CHLORIDE SERPL-SCNC: 100 MMOL/L (ref 98–107)
CO2 SERPL-SCNC: 23 MMOL/L (ref 22–30)
CREAT SERPL-SCNC: 0.67 UMOL/L (ref 0.52–1.25)
EOSINOPHIL # BLD: 0.6 10*3/UL (ref 0–0.7)
EOSINOPHIL NFR BLD: 3.9 % (ref 0–4)
ERYTHROCYTE [DISTWIDTH] IN BLOOD BY AUTOMATED COUNT: 14 % (ref 11.5–14.5)
GLUCOSE SERPL-MCNC: 97 MG/DL (ref 74–106)
GRANULOCYTES # BLD AUTO: 70.2 % (ref 42.2–75.2)
HCT VFR BLD AUTO: 33.4 % (ref 37–47)
HGB BLD-MCNC: 10.9 G/DL (ref 12.5–16)
LYMPHOCYTES # BLD: 2.5 10*3/UL (ref 1.2–3.4)
LYMPHOCYTES NFR BLD: 16.8 % (ref 20–51)
MCH RBC QN AUTO: 30 PG (ref 27–31)
MCHC RBC AUTO-ENTMCNC: 33 G/DL (ref 33–37)
MCV RBC AUTO: 91 FL (ref 80–100)
MONOCYTES # BLD: 1.2 10*3/UL (ref 0.1–0.6)
MONOCYTES NFR BLD AUTO: 8.3 % (ref 1.7–9.3)
NEUTROPHILS # BLD: 10.5 10*3/UL (ref 1.4–6.5)
PH UR STRIP.AUTO: 6 [PH] (ref 5–8)
PLATELET # BLD AUTO: 443 K/MM3 (ref 130–400)
PMV BLD AUTO: 9.8 FL (ref 7.4–10.4)
POTASSIUM SERPL-SCNC: 4.3 MMOL/L (ref 3.4–5)
PROT SERPL-MCNC: 6.5 GM/DL (ref 6.4–8.2)
RBC # BLD AUTO: 3.67 M/MM3 (ref 4.1–5.3)
RBC # UR: (no result) /HPF
SODIUM SERPL-SCNC: 132 MMOL/L (ref 137–145)
SP GR UR STRIP.AUTO: 1 (ref 1–1.03)
SQUAMOUS # URNS: (no result) /HPF
URN COLLECT METHOD CLASS: (no result)

## 2019-08-29 ENCOUNTER — HOSPITAL ENCOUNTER (EMERGENCY)
Dept: HOSPITAL 19 - COL.ER | Age: 69
Discharge: HOME | End: 2019-08-29
Payer: MEDICARE

## 2019-08-29 VITALS — TEMPERATURE: 98.3 F | HEART RATE: 95 BPM | SYSTOLIC BLOOD PRESSURE: 96 MMHG | DIASTOLIC BLOOD PRESSURE: 46 MMHG

## 2019-08-29 VITALS — WEIGHT: 133.38 LBS | BODY MASS INDEX: 21.44 KG/M2 | HEIGHT: 65.98 IN

## 2019-08-29 DIAGNOSIS — Z90.710: ICD-10-CM

## 2019-08-29 DIAGNOSIS — K21.9: ICD-10-CM

## 2019-08-29 DIAGNOSIS — K85.90: Primary | ICD-10-CM

## 2019-08-29 DIAGNOSIS — Z90.89: ICD-10-CM

## 2019-08-29 DIAGNOSIS — G43.909: ICD-10-CM

## 2019-08-29 DIAGNOSIS — Z90.49: ICD-10-CM

## 2019-08-29 DIAGNOSIS — G62.9: ICD-10-CM

## 2019-08-29 DIAGNOSIS — F17.210: ICD-10-CM

## 2019-08-29 DIAGNOSIS — J44.9: ICD-10-CM

## 2019-08-29 DIAGNOSIS — E03.9: ICD-10-CM

## 2019-08-29 DIAGNOSIS — Z79.51: ICD-10-CM

## 2019-08-29 LAB
ALBUMIN SERPL-MCNC: 3.6 GM/DL (ref 3.5–5)
ALP SERPL-CCNC: 125 U/L (ref 50–136)
ALT SERPL-CCNC: < 6 U/L (ref 9–52)
AMYLASE SERPL-CCNC: 204 U/L (ref 30–110)
ANION GAP SERPL CALC-SCNC: 10 MMOL/L (ref 7–16)
ANISOCYTOSIS BLD QL: (no result)
AST SERPL-CCNC: 29 U/L (ref 15–37)
BILIRUB SERPL-MCNC: 0.6 MG/DL (ref 0–1)
BUN SERPL-MCNC: 7 MG/DL (ref 7–17)
CALCIUM SERPL-MCNC: 9 MG/DL (ref 8.4–10.2)
CHLORIDE SERPL-SCNC: 94 MMOL/L (ref 98–107)
CO2 SERPL-SCNC: 25 MMOL/L (ref 22–30)
CREAT SERPL-SCNC: 0.69 UMOL/L (ref 0.52–1.25)
CRP SERPL-MCNC: 7.7 MG/DL (ref 0–0.9)
EOSINOPHIL NFR BLD: 2 % (ref 0–4)
ERYTHROCYTE [DISTWIDTH] IN BLOOD BY AUTOMATED COUNT: 14 % (ref 11.5–14.5)
GLUCOSE SERPL-MCNC: 119 MG/DL (ref 74–106)
HCT VFR BLD AUTO: 36.1 % (ref 37–47)
HGB BLD-MCNC: 11.7 G/DL (ref 12.5–16)
HYPOCHROMIA BLD QL SMEAR: (no result)
LIPASE SERPL-CCNC: 862 U/L (ref 23–300)
LYMPHOCYTES NFR BLD MANUAL: 10 % (ref 20–51)
MCH RBC QN AUTO: 30 PG (ref 27–31)
MCHC RBC AUTO-ENTMCNC: 32 G/DL (ref 33–37)
MCV RBC AUTO: 91 FL (ref 80–100)
MONOCYTES NFR BLD: 8 % (ref 1.7–9.3)
NEUTS SEG NFR BLD MANUAL: 80 % (ref 42–75.2)
PLATELET # BLD AUTO: 510 K/MM3 (ref 130–400)
PLATELET BLD QL SMEAR: (no result)
PMV BLD AUTO: 9.5 FL (ref 7.4–10.4)
POTASSIUM SERPL-SCNC: 4 MMOL/L (ref 3.4–5)
PROT SERPL-MCNC: 6.6 GM/DL (ref 6.4–8.2)
RBC # BLD AUTO: 3.97 M/MM3 (ref 4.1–5.3)
SODIUM SERPL-SCNC: 130 MMOL/L (ref 137–145)

## 2019-11-06 ENCOUNTER — HOSPITAL ENCOUNTER (EMERGENCY)
Dept: HOSPITAL 19 - COL.ER | Age: 69
Discharge: ANOTHER HOSPITAL | End: 2019-11-06
Payer: MEDICARE

## 2019-11-06 VITALS — BODY MASS INDEX: 18.85 KG/M2 | WEIGHT: 117.29 LBS | HEIGHT: 65.98 IN

## 2019-11-06 VITALS — SYSTOLIC BLOOD PRESSURE: 118 MMHG | DIASTOLIC BLOOD PRESSURE: 69 MMHG | HEART RATE: 97 BPM

## 2019-11-06 VITALS — TEMPERATURE: 98.3 F

## 2019-11-06 DIAGNOSIS — K85.90: ICD-10-CM

## 2019-11-06 DIAGNOSIS — F32.9: ICD-10-CM

## 2019-11-06 DIAGNOSIS — J90: ICD-10-CM

## 2019-11-06 DIAGNOSIS — J44.9: ICD-10-CM

## 2019-11-06 DIAGNOSIS — F17.210: ICD-10-CM

## 2019-11-06 DIAGNOSIS — I31.3: Primary | ICD-10-CM

## 2019-11-06 DIAGNOSIS — K94.23: ICD-10-CM

## 2019-11-06 LAB
ALBUMIN SERPL-MCNC: 3.6 GM/DL (ref 3.5–5)
ALP SERPL-CCNC: 149 U/L (ref 50–136)
ALT SERPL-CCNC: 12 U/L (ref 9–52)
ANION GAP SERPL CALC-SCNC: 14 MMOL/L (ref 7–16)
ANISOCYTOSIS BLD QL: (no result)
AST SERPL-CCNC: 30 U/L (ref 15–37)
BILIRUB SERPL-MCNC: 0.6 MG/DL (ref 0–1)
BUN SERPL-MCNC: 21 MG/DL (ref 7–17)
CALCIUM SERPL-MCNC: 8.8 MG/DL (ref 8.4–10.2)
CHLORIDE SERPL-SCNC: 93 MMOL/L (ref 98–107)
CO2 SERPL-SCNC: 23 MMOL/L (ref 22–30)
CREAT SERPL-SCNC: 0.72 UMOL/L (ref 0.52–1.25)
CRP SERPL-MCNC: 35.2 MG/DL (ref 0–0.9)
ERYTHROCYTE [DISTWIDTH] IN BLOOD BY AUTOMATED COUNT: 15.5 % (ref 11.5–14.5)
GLUCOSE SERPL-MCNC: 96 MG/DL (ref 74–106)
HCT VFR BLD AUTO: 28 % (ref 37–47)
HGB BLD-MCNC: 8.8 G/DL (ref 12.5–16)
HYPOCHROMIA BLD QL SMEAR: (no result)
KETONES UR STRIP.AUTO-MCNC: (no result) MG/DL
LIPASE SERPL-CCNC: 1231 U/L (ref 23–300)
LYMPHOCYTES NFR BLD MANUAL: 3 % (ref 20–51)
MCH RBC QN AUTO: 28 PG (ref 27–31)
MCHC RBC AUTO-ENTMCNC: 31 G/DL (ref 33–37)
MCV RBC AUTO: 88 FL (ref 80–100)
MONOCYTES NFR BLD: 4 % (ref 1.7–9.3)
NEUTS BAND NFR BLD: 10 % (ref 0–10)
NEUTS SEG NFR BLD MANUAL: 83 % (ref 42–75.2)
PH UR STRIP.AUTO: 6 [PH] (ref 5–8)
PLATELET # BLD AUTO: 733 K/MM3 (ref 130–400)
PLATELET BLD QL SMEAR: (no result)
PMV BLD AUTO: 9.3 FL (ref 7.4–10.4)
POTASSIUM SERPL-SCNC: 4.9 MMOL/L (ref 3.4–5)
PROT SERPL-MCNC: 7.1 GM/DL (ref 6.4–8.2)
RBC # BLD AUTO: 3.17 M/MM3 (ref 4.1–5.3)
RBC # UR: (no result) /HPF
SODIUM SERPL-SCNC: 130 MMOL/L (ref 137–145)
SP GR UR STRIP.AUTO: 1.02 (ref 1–1.03)
SQUAMOUS # URNS: (no result) /HPF
URN COLLECT METHOD CLASS: (no result)

## 2020-03-06 ENCOUNTER — HOSPITAL ENCOUNTER (OUTPATIENT)
Dept: HOSPITAL 19 - MC.RAD | Age: 70
End: 2020-03-06
Attending: FAMILY MEDICINE
Payer: MEDICARE

## 2020-03-06 DIAGNOSIS — Z12.31: Primary | ICD-10-CM

## 2021-03-08 ENCOUNTER — HOSPITAL ENCOUNTER (OUTPATIENT)
Dept: HOSPITAL 19 - MC.RAD | Age: 71
End: 2021-03-08
Attending: FAMILY MEDICINE
Payer: MEDICARE

## 2021-03-08 DIAGNOSIS — Z12.31: Primary | ICD-10-CM

## 2021-06-10 ENCOUNTER — HOSPITAL ENCOUNTER (OUTPATIENT)
Dept: HOSPITAL 19 - COL.RAD | Age: 71
End: 2021-06-10
Attending: PHYSICIAN ASSISTANT
Payer: MEDICARE

## 2021-06-10 DIAGNOSIS — Z87.891: ICD-10-CM

## 2021-06-10 DIAGNOSIS — Z12.2: Primary | ICD-10-CM

## 2022-01-17 ENCOUNTER — HOSPITAL ENCOUNTER (INPATIENT)
Dept: HOSPITAL 19 - COL.ER | Age: 72
LOS: 3 days | Discharge: HOME HEALTH | DRG: 871 | End: 2022-01-20
Attending: STUDENT IN AN ORGANIZED HEALTH CARE EDUCATION/TRAINING PROGRAM | Admitting: STUDENT IN AN ORGANIZED HEALTH CARE EDUCATION/TRAINING PROGRAM
Payer: MEDICARE

## 2022-01-17 VITALS — BODY MASS INDEX: 19.48 KG/M2 | HEIGHT: 67.01 IN | WEIGHT: 124.12 LBS

## 2022-01-17 DIAGNOSIS — E87.1: ICD-10-CM

## 2022-01-17 DIAGNOSIS — A41.9: Primary | ICD-10-CM

## 2022-01-17 DIAGNOSIS — E03.9: ICD-10-CM

## 2022-01-17 DIAGNOSIS — F17.200: ICD-10-CM

## 2022-01-17 DIAGNOSIS — G89.29: ICD-10-CM

## 2022-01-17 DIAGNOSIS — Z90.49: ICD-10-CM

## 2022-01-17 DIAGNOSIS — I48.0: ICD-10-CM

## 2022-01-17 DIAGNOSIS — Z20.822: ICD-10-CM

## 2022-01-17 DIAGNOSIS — E86.0: ICD-10-CM

## 2022-01-17 DIAGNOSIS — Z96.642: ICD-10-CM

## 2022-01-17 DIAGNOSIS — J44.1: ICD-10-CM

## 2022-01-17 DIAGNOSIS — K86.1: ICD-10-CM

## 2022-01-17 DIAGNOSIS — M79.7: ICD-10-CM

## 2022-01-17 DIAGNOSIS — J18.9: ICD-10-CM

## 2022-01-17 DIAGNOSIS — F32.A: ICD-10-CM

## 2022-01-17 DIAGNOSIS — K21.9: ICD-10-CM

## 2022-01-17 DIAGNOSIS — Z79.01: ICD-10-CM

## 2022-01-17 DIAGNOSIS — N17.9: ICD-10-CM

## 2022-01-17 DIAGNOSIS — Z90.710: ICD-10-CM

## 2022-01-17 DIAGNOSIS — Z95.0: ICD-10-CM

## 2022-01-17 DIAGNOSIS — J44.0: ICD-10-CM

## 2022-01-17 DIAGNOSIS — Z79.890: ICD-10-CM

## 2022-01-17 DIAGNOSIS — E87.6: ICD-10-CM

## 2022-01-17 DIAGNOSIS — M81.0: ICD-10-CM

## 2022-01-17 DIAGNOSIS — Z23: ICD-10-CM

## 2022-01-17 DIAGNOSIS — D64.9: ICD-10-CM

## 2022-01-17 LAB
ALBUMIN SERPL-MCNC: 2.7 GM/DL (ref 3.4–4.8)
ALP SERPL-CCNC: 86 U/L (ref 40–150)
ALT SERPL-CCNC: 7 U/L (ref 0–55)
ANION GAP SERPL CALC-SCNC: 13 MMOL/L (ref 7–16)
AST SERPL-CCNC: 20 U/L (ref 5–34)
BASOPHILS # BLD: 0.1 K/MM3 (ref 0–0.2)
BASOPHILS NFR BLD AUTO: 0.2 % (ref 0–2)
BILIRUB SERPL-MCNC: 0.7 MG/DL (ref 0.2–1.2)
BUN SERPL-MCNC: 47 MG/DL (ref 10–20)
CALCIUM SERPL-MCNC: 8 MG/DL (ref 8.4–10.2)
CHLORIDE SERPL-SCNC: 100 MMOL/L (ref 98–107)
CO2 SERPL-SCNC: 20 MMOL/L (ref 23–31)
CREAT SERPL-SCNC: 1.75 MG/DL (ref 0.57–1.11)
EOSINOPHIL # BLD: 0 K/MM3 (ref 0–0.7)
EOSINOPHIL NFR BLD: 0.2 % (ref 0–4)
ERYTHROCYTE [DISTWIDTH] IN BLOOD BY AUTOMATED COUNT: 13.2 % (ref 11.5–14.5)
GLUCOSE SERPL-MCNC: 93 MG/DL (ref 70–99)
GRANULOCYTES # BLD AUTO: 75.6 % (ref 42.2–75.2)
HCT VFR BLD AUTO: 36.1 % (ref 37–47)
HGB BLD-MCNC: 12.3 G/DL (ref 12.5–16)
LYMPHOCYTES # BLD: 2.6 K/MM3 (ref 1.2–3.4)
LYMPHOCYTES NFR BLD MANUAL: 13 % (ref 20–51)
LYMPHOCYTES NFR BLD: 11.9 % (ref 20–51)
MCH RBC QN AUTO: 31 PG (ref 27–31)
MCHC RBC AUTO-ENTMCNC: 34 G/DL (ref 33–37)
MCV RBC AUTO: 92 FL (ref 80–100)
MONOCYTES # BLD: 2.4 K/MM3 (ref 0.1–0.6)
MONOCYTES NFR BLD AUTO: 11.2 % (ref 1.7–9.3)
MONOCYTES NFR BLD: 1 % (ref 1.7–9.3)
NEUTROPHILS # BLD: 16.3 K/MM3 (ref 1.4–6.5)
NEUTS BAND NFR BLD: 2 % (ref 0–10)
NEUTS SEG NFR BLD MANUAL: 84 % (ref 42–75.2)
PH UR STRIP.AUTO: 5 [PH] (ref 5–8)
PLATELET # BLD AUTO: 322 K/MM3 (ref 130–400)
PLATELET BLD QL SMEAR: NORMAL
PMV BLD AUTO: 10.2 FL (ref 7.4–10.4)
POTASSIUM SERPL-SCNC: 3.7 MMOL/L (ref 3.5–4.5)
PROT SERPL-MCNC: 6 GM/DL (ref 6.2–8.1)
RBC # BLD AUTO: 3.94 M/MM3 (ref 4.1–5.3)
RBC # UR STRIP.AUTO: (no result) /UL
RBC # UR: (no result) /HPF (ref 0–2)
SODIUM SERPL-SCNC: 133 MMOL/L (ref 136–145)
SP GR UR STRIP.AUTO: 1.02 (ref 1–1.03)
SQUAMOUS # URNS: (no result) /HPF (ref 0–10)
TROPONIN I SERPL-MCNC: 0.03 NG/ML (ref 0–0.03)
UA DIPSTICK PNL UR STRIP.AUTO: (no result)
URN COLLECT METHOD CLASS: (no result)

## 2022-01-18 VITALS — HEART RATE: 61 BPM | DIASTOLIC BLOOD PRESSURE: 60 MMHG | SYSTOLIC BLOOD PRESSURE: 103 MMHG | TEMPERATURE: 98 F

## 2022-01-18 VITALS — HEART RATE: 60 BPM | DIASTOLIC BLOOD PRESSURE: 59 MMHG | SYSTOLIC BLOOD PRESSURE: 108 MMHG | TEMPERATURE: 97.7 F

## 2022-01-18 VITALS — DIASTOLIC BLOOD PRESSURE: 58 MMHG | HEART RATE: 84 BPM | SYSTOLIC BLOOD PRESSURE: 119 MMHG | TEMPERATURE: 97.8 F

## 2022-01-18 VITALS — DIASTOLIC BLOOD PRESSURE: 62 MMHG | TEMPERATURE: 97.8 F | HEART RATE: 74 BPM | SYSTOLIC BLOOD PRESSURE: 121 MMHG

## 2022-01-18 VITALS — SYSTOLIC BLOOD PRESSURE: 105 MMHG | TEMPERATURE: 98.1 F | DIASTOLIC BLOOD PRESSURE: 48 MMHG | HEART RATE: 59 BPM

## 2022-01-18 VITALS — HEART RATE: 98 BPM | DIASTOLIC BLOOD PRESSURE: 63 MMHG | SYSTOLIC BLOOD PRESSURE: 107 MMHG | TEMPERATURE: 98.3 F

## 2022-01-18 LAB
ANION GAP SERPL CALC-SCNC: 10 MMOL/L (ref 7–16)
BASOPHILS # BLD: 0 K/MM3 (ref 0–0.2)
BASOPHILS NFR BLD AUTO: 0.2 % (ref 0–2)
BUN SERPL-MCNC: 35 MG/DL (ref 10–20)
CALCIUM SERPL-MCNC: 7.8 MG/DL (ref 8.4–10.2)
CHLORIDE SERPL-SCNC: 106 MMOL/L (ref 98–107)
CO2 SERPL-SCNC: 19 MMOL/L (ref 23–31)
CREAT SERPL-SCNC: 1.02 MG/DL (ref 0.57–1.11)
EOSINOPHIL # BLD: 0 K/MM3 (ref 0–0.7)
EOSINOPHIL NFR BLD: 0.2 % (ref 0–4)
ERYTHROCYTE [DISTWIDTH] IN BLOOD BY AUTOMATED COUNT: 13.4 % (ref 11.5–14.5)
GLUCOSE SERPL-MCNC: 91 MG/DL (ref 70–99)
GRANULOCYTES # BLD AUTO: 87.9 % (ref 42.2–75.2)
HCT VFR BLD AUTO: 35 % (ref 37–47)
HGB BLD-MCNC: 11.6 G/DL (ref 12.5–16)
LYMPHOCYTES # BLD: 1 K/MM3 (ref 1.2–3.4)
LYMPHOCYTES NFR BLD: 6.1 % (ref 20–51)
MCH RBC QN AUTO: 31 PG (ref 27–31)
MCHC RBC AUTO-ENTMCNC: 33 G/DL (ref 33–37)
MCV RBC AUTO: 94 FL (ref 80–100)
MONOCYTES # BLD: 0.8 K/MM3 (ref 0.1–0.6)
MONOCYTES NFR BLD AUTO: 4.9 % (ref 1.7–9.3)
NEUTROPHILS # BLD: 14.7 K/MM3 (ref 1.4–6.5)
PLATELET # BLD AUTO: 316 K/MM3 (ref 130–400)
PMV BLD AUTO: 9.9 FL (ref 7.4–10.4)
POTASSIUM SERPL-SCNC: 3.2 MMOL/L (ref 3.5–4.5)
RBC # BLD AUTO: 3.71 M/MM3 (ref 4.1–5.3)
SODIUM SERPL-SCNC: 135 MMOL/L (ref 136–145)

## 2022-01-18 PROCEDURE — 4B02XSZ MEASUREMENT OF CARDIAC PACEMAKER, EXTERNAL APPROACH: ICD-10-PCS | Performed by: STUDENT IN AN ORGANIZED HEALTH CARE EDUCATION/TRAINING PROGRAM

## 2022-01-19 VITALS — SYSTOLIC BLOOD PRESSURE: 122 MMHG | HEART RATE: 62 BPM | DIASTOLIC BLOOD PRESSURE: 57 MMHG

## 2022-01-19 VITALS — DIASTOLIC BLOOD PRESSURE: 57 MMHG | SYSTOLIC BLOOD PRESSURE: 129 MMHG | TEMPERATURE: 97.6 F | HEART RATE: 66 BPM

## 2022-01-19 VITALS — SYSTOLIC BLOOD PRESSURE: 125 MMHG | HEART RATE: 75 BPM | DIASTOLIC BLOOD PRESSURE: 55 MMHG | TEMPERATURE: 97.9 F

## 2022-01-19 VITALS — HEART RATE: 65 BPM | SYSTOLIC BLOOD PRESSURE: 140 MMHG | TEMPERATURE: 97.6 F | DIASTOLIC BLOOD PRESSURE: 63 MMHG

## 2022-01-19 VITALS — HEART RATE: 63 BPM | SYSTOLIC BLOOD PRESSURE: 139 MMHG | DIASTOLIC BLOOD PRESSURE: 64 MMHG | TEMPERATURE: 97.4 F

## 2022-01-19 VITALS — SYSTOLIC BLOOD PRESSURE: 117 MMHG | TEMPERATURE: 97.3 F | DIASTOLIC BLOOD PRESSURE: 58 MMHG | HEART RATE: 80 BPM

## 2022-01-19 LAB
ANION GAP SERPL CALC-SCNC: 10 MMOL/L (ref 7–16)
BUN SERPL-MCNC: 20 MG/DL (ref 10–20)
CALCIUM SERPL-MCNC: 8.2 MG/DL (ref 8.4–10.2)
CHLORIDE SERPL-SCNC: 103 MMOL/L (ref 98–107)
CO2 SERPL-SCNC: 21 MMOL/L (ref 23–31)
CREAT SERPL-SCNC: 0.78 MG/DL (ref 0.57–1.11)
ERYTHROCYTE [DISTWIDTH] IN BLOOD BY AUTOMATED COUNT: 13.4 % (ref 11.5–14.5)
GLUCOSE SERPL-MCNC: 166 MG/DL (ref 70–99)
HCT VFR BLD AUTO: 31.3 % (ref 37–47)
HGB BLD-MCNC: 10.4 G/DL (ref 12.5–16)
LYMPHOCYTES NFR BLD MANUAL: 8 % (ref 20–51)
MCH RBC QN AUTO: 31 PG (ref 27–31)
MCHC RBC AUTO-ENTMCNC: 33 G/DL (ref 33–37)
MCV RBC AUTO: 93 FL (ref 80–100)
MONOCYTES NFR BLD: 3 % (ref 1.7–9.3)
NEUTS BAND NFR BLD: 2 % (ref 0–10)
NEUTS SEG NFR BLD MANUAL: 87 % (ref 42–75.2)
PLATELET # BLD AUTO: 325 K/MM3 (ref 130–400)
PLATELET BLD QL SMEAR: NORMAL
PMV BLD AUTO: 9.8 FL (ref 7.4–10.4)
POTASSIUM SERPL-SCNC: 4.2 MMOL/L (ref 3.5–4.5)
RBC # BLD AUTO: 3.35 M/MM3 (ref 4.1–5.3)
SODIUM SERPL-SCNC: 134 MMOL/L (ref 136–145)

## 2022-01-20 VITALS — HEART RATE: 62 BPM | SYSTOLIC BLOOD PRESSURE: 124 MMHG | DIASTOLIC BLOOD PRESSURE: 62 MMHG

## 2022-01-20 VITALS — SYSTOLIC BLOOD PRESSURE: 159 MMHG | TEMPERATURE: 97.8 F | HEART RATE: 60 BPM | DIASTOLIC BLOOD PRESSURE: 65 MMHG

## 2022-01-20 LAB
ANION GAP SERPL CALC-SCNC: 10 MMOL/L (ref 7–16)
BASOPHILS # BLD: 0 K/MM3 (ref 0–0.2)
BASOPHILS NFR BLD AUTO: 0.1 % (ref 0–2)
BUN SERPL-MCNC: 18 MG/DL (ref 10–20)
CALCIUM SERPL-MCNC: 8.2 MG/DL (ref 8.4–10.2)
CHLORIDE SERPL-SCNC: 103 MMOL/L (ref 98–107)
CO2 SERPL-SCNC: 22 MMOL/L (ref 23–31)
CREAT SERPL-SCNC: 0.8 MG/DL (ref 0.57–1.11)
EOSINOPHIL # BLD: 0 K/MM3 (ref 0–0.7)
EOSINOPHIL NFR BLD: 0 % (ref 0–4)
ERYTHROCYTE [DISTWIDTH] IN BLOOD BY AUTOMATED COUNT: 13.4 % (ref 11.5–14.5)
GLUCOSE SERPL-MCNC: 122 MG/DL (ref 70–99)
GRANULOCYTES # BLD AUTO: 79 % (ref 42.2–75.2)
HCT VFR BLD AUTO: 30.6 % (ref 37–47)
HGB BLD-MCNC: 10.2 G/DL (ref 12.5–16)
LYMPHOCYTES # BLD: 1.9 K/MM3 (ref 1.2–3.4)
LYMPHOCYTES NFR BLD: 12.7 % (ref 20–51)
MCH RBC QN AUTO: 31 PG (ref 27–31)
MCHC RBC AUTO-ENTMCNC: 33 G/DL (ref 33–37)
MCV RBC AUTO: 94 FL (ref 80–100)
MONOCYTES # BLD: 1.1 K/MM3 (ref 0.1–0.6)
MONOCYTES NFR BLD AUTO: 7.4 % (ref 1.7–9.3)
NEUTROPHILS # BLD: 11.7 K/MM3 (ref 1.4–6.5)
PLATELET # BLD AUTO: 346 K/MM3 (ref 130–400)
PMV BLD AUTO: 10.3 FL (ref 7.4–10.4)
POTASSIUM SERPL-SCNC: 4 MMOL/L (ref 3.5–4.5)
RBC # BLD AUTO: 3.27 M/MM3 (ref 4.1–5.3)
SODIUM SERPL-SCNC: 135 MMOL/L (ref 136–145)

## 2022-04-13 ENCOUNTER — HOSPITAL ENCOUNTER (OUTPATIENT)
Dept: HOSPITAL 19 - MC.RAD | Age: 72
End: 2022-04-13
Attending: FAMILY MEDICINE
Payer: MEDICARE

## 2022-04-13 DIAGNOSIS — Z12.31: Primary | ICD-10-CM

## 2022-06-10 ENCOUNTER — HOSPITAL ENCOUNTER (OUTPATIENT)
Dept: HOSPITAL 19 - COL.RAD | Age: 72
End: 2022-06-10
Attending: PHYSICIAN ASSISTANT
Payer: MEDICARE

## 2022-06-10 DIAGNOSIS — J43.9: ICD-10-CM

## 2022-06-10 DIAGNOSIS — Z12.2: Primary | ICD-10-CM

## 2022-06-10 DIAGNOSIS — J98.4: ICD-10-CM

## 2022-06-10 DIAGNOSIS — Z87.891: ICD-10-CM

## 2024-05-23 ENCOUNTER — HOSPITAL ENCOUNTER (OUTPATIENT)
Dept: HOSPITAL 19 - MC.RAD | Age: 74
End: 2024-05-23
Attending: FAMILY MEDICINE
Payer: MEDICARE

## 2024-05-23 DIAGNOSIS — Z12.31: Primary | ICD-10-CM

## 2024-08-21 ENCOUNTER — HOSPITAL ENCOUNTER (OUTPATIENT)
Dept: HOSPITAL 19 - COL.RAD | Age: 74
End: 2024-08-21
Attending: FAMILY MEDICINE
Payer: MEDICARE

## 2024-08-21 DIAGNOSIS — R51.9: Primary | ICD-10-CM

## 2024-08-21 DIAGNOSIS — Z79.01: ICD-10-CM

## 2024-09-24 ENCOUNTER — HOSPITAL ENCOUNTER (OUTPATIENT)
Dept: HOSPITAL 19 - COL.RAD | Age: 74
End: 2024-09-24
Attending: FAMILY MEDICINE
Payer: MEDICARE

## 2024-09-24 DIAGNOSIS — R74.8: ICD-10-CM

## 2024-09-24 DIAGNOSIS — K86.9: ICD-10-CM

## 2024-09-24 DIAGNOSIS — D35.02: Primary | ICD-10-CM

## 2024-10-09 ENCOUNTER — HOSPITAL ENCOUNTER (INPATIENT)
Dept: HOSPITAL 19 - COL.ER | Age: 74
LOS: 2 days | Discharge: HOME | DRG: 440 | End: 2024-10-11
Attending: INTERNAL MEDICINE | Admitting: INTERNAL MEDICINE
Payer: MEDICARE

## 2024-10-09 VITALS — WEIGHT: 109.79 LBS | HEIGHT: 67.01 IN | BODY MASS INDEX: 17.23 KG/M2

## 2024-10-09 DIAGNOSIS — M79.7: ICD-10-CM

## 2024-10-09 DIAGNOSIS — K85.90: Primary | ICD-10-CM

## 2024-10-09 DIAGNOSIS — I10: ICD-10-CM

## 2024-10-09 DIAGNOSIS — E03.9: ICD-10-CM

## 2024-10-09 DIAGNOSIS — I48.91: ICD-10-CM

## 2024-10-09 DIAGNOSIS — G89.29: ICD-10-CM

## 2024-10-10 VITALS — SYSTOLIC BLOOD PRESSURE: 127 MMHG

## 2024-10-10 VITALS — TEMPERATURE: 98.7 F | HEART RATE: 62 BPM | DIASTOLIC BLOOD PRESSURE: 58 MMHG | SYSTOLIC BLOOD PRESSURE: 131 MMHG

## 2024-10-10 VITALS — HEART RATE: 60 BPM | TEMPERATURE: 97.9 F | SYSTOLIC BLOOD PRESSURE: 107 MMHG | DIASTOLIC BLOOD PRESSURE: 65 MMHG

## 2024-10-10 VITALS — TEMPERATURE: 97.6 F | HEART RATE: 83 BPM | SYSTOLIC BLOOD PRESSURE: 127 MMHG | DIASTOLIC BLOOD PRESSURE: 65 MMHG

## 2024-10-10 VITALS — TEMPERATURE: 98.2 F | DIASTOLIC BLOOD PRESSURE: 68 MMHG | HEART RATE: 64 BPM | SYSTOLIC BLOOD PRESSURE: 127 MMHG

## 2024-10-10 VITALS — SYSTOLIC BLOOD PRESSURE: 132 MMHG | TEMPERATURE: 97.7 F | DIASTOLIC BLOOD PRESSURE: 63 MMHG | HEART RATE: 59 BPM

## 2024-10-10 VITALS — SYSTOLIC BLOOD PRESSURE: 124 MMHG | TEMPERATURE: 98.2 F | HEART RATE: 61 BPM | DIASTOLIC BLOOD PRESSURE: 57 MMHG

## 2024-10-10 LAB
ALBUMIN SERPL-MCNC: 3.8 G/DL (ref 3.4–4.8)
ALP SERPL-CCNC: 66 U/L (ref 40–150)
ALT SERPL-CCNC: 12 U/L (ref 0–55)
ANION GAP SERPL CALC-SCNC: 12 MMOL/L (ref 7–16)
APPEARANCE UR: CLEAR
AST SERPL-CCNC: 18 U/L (ref 5–34)
BASOPHILS # BLD: 0.1 K/MM3 (ref 0–0.2)
BASOPHILS NFR BLD AUTO: 0.5 % (ref 0–2)
BILIRUB SERPL-MCNC: 0.3 MG/DL (ref 0.2–1.2)
BUN SERPL-MCNC: 19 MG/DL (ref 10–20)
CALCIUM SERPL-MCNC: 9.7 MG/DL (ref 8.4–10.2)
CHLORIDE SERPL-SCNC: 108 MEQ/L (ref 98–107)
COLOR UR AUTO: YELLOW
CREAT SERPL-SCNC: 0.98 MG/DL (ref 0.57–1.11)
EOSINOPHIL # BLD: 0.3 K/MM3 (ref 0–0.7)
EOSINOPHIL NFR BLD: 2.7 % (ref 0–4)
ERYTHROCYTE [DISTWIDTH] IN BLOOD BY AUTOMATED COUNT: 14.2 % (ref 11.5–14.5)
GLUCOSE SERPL-MCNC: 80 MG/DL (ref 70–99)
GLUCOSE UR QL STRIP.AUTO: NEGATIVE
GRANULOCYTES # BLD AUTO: 65.4 % (ref 42.2–75.2)
HCT VFR BLD AUTO: 38.9 % (ref 37–47)
HGB BLD-MCNC: 12.2 G/DL (ref 12.5–16)
KETONES UR STRIP.AUTO-MCNC: (no result) MG/DL
LIPASE SERPL-CCNC: 532 U/L (ref 8–78)
LYMPHOCYTES # BLD: 2.6 K/MM3 (ref 1.2–3.4)
LYMPHOCYTES NFR BLD: 22.3 % (ref 20–51)
MCH RBC QN AUTO: 31 PG (ref 27–31)
MCHC RBC AUTO-ENTMCNC: 31 G/DL (ref 33–37)
MCV RBC AUTO: 98 FL (ref 80–100)
MONOCYTES # BLD: 1 K/MM3 (ref 0.1–0.6)
MONOCYTES NFR BLD AUTO: 8.7 % (ref 1.7–9.3)
NEUTROPHILS # BLD: 7.7 K/MM3 (ref 1.4–6.5)
NITRATE UR-MCNC: NEGATIVE UG/ML
PH UR STRIP.AUTO: 6.5 [PH] (ref 5–8.5)
PLATELET # BLD AUTO: 245 K/MM3 (ref 130–400)
PMV BLD AUTO: 11.3 FL (ref 7.4–10.4)
POTASSIUM SERPL-SCNC: 4.1 MEQ/L (ref 3.5–4.5)
PROT SERPL-MCNC: 7.5 G/DL (ref 6.2–8.1)
RBC # BLD AUTO: 3.98 M/MM3 (ref 4.1–5.3)
RBC # UR STRIP.AUTO: (no result) /UL
SODIUM SERPL-SCNC: 140 MEQ/L (ref 136–145)
SP GR UR STRIP.AUTO: > 1.03 (ref 1–1.03)
TROPONIN I SERPL-MCNC: < 0.01 NG/ML (ref 0–0.03)
UA DIPSTICK PNL UR STRIP.AUTO: NEGATIVE
URN COLLECT METHOD CLASS: (no result)
UROBILINOGEN UR STRIP.AUTO-MCNC: 1 E.U/DL (ref 0.2–1)

## 2024-10-10 NOTE — NUR
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 Patient is in bed with bed in
lowest position and two bedrails up. Patient has LR running in LAC IV at 75
mL/hr. Pt has call light within reach.

## 2024-10-10 NOTE — NUR
Hospitalist HOLA Olmos called and asked if patient is okay to have ice chips.
Stated she is okay to have ice chips.

## 2024-10-10 NOTE — NUR
This nursing student recieved report from AYLIN Enriquez. Patient is awake in bed
with bed in lowest postition with 3 bedrails up and call light within reach.
Patient has IV to RAC with LR running at 100 mL/hr.

## 2024-10-10 NOTE — NUR
Bedside report received from AYLIN Enriquez. Pt awake in bed with no complaints.
Call light within reach.

## 2024-10-10 NOTE — NUR
met with pt to discuss discharge planning. She reports to live
alone in [place], but her neighbor assists her when needed. She states to
also have [facility] services, as needed. Pt states she sees Dr. Giles
for PCP needs and obtains medications from [facility] with no difficulties.
She confirmed to have Medicare A/B and [employer] [place] insurance. Pt states she is
typically independent with ADLS and uses a [facility] for DME. She reports her
daughter, Mere [phone] is her contact and NOK. HOLLY verified this on
file. Pt was open to Home Health services upon discharge, if reccomended and
she would like to use [facility].
 
SW called [facility] and they do not have pt actively on services, she was
discharged in 2022.
 
Discharge Plan: likely home with HH

## 2024-10-10 NOTE — NUR
Initial visit; Patient thanked  for looking in on her and offering
prayer, listening and keeping her in her thoughts and prayers. 
reminded her to give her concerns to God and don't take them back.

## 2024-10-10 NOTE — NUR
Spoke with Dr. Worthy about how patient is tolerating clear liquid diet.
Informed her patient is tolerating well and no complaints of nausea or
signifigant pain. Recieved orders to DC IVF after current bag is completed
and advance diet to full liquid in the morning.

## 2024-10-10 NOTE — NUR
Pt awake in bed with no complaints. Shift assessment completed. VSS. IVF
infusing into Rt AC at 75mL/hr as ordered with no complications. Pt has
complaints of tenderness to lower ABD with touch. Pt tolerated clear liquid
diet at lunch with minimal complaints of nausea. Pt ambulates with SBA to
bathroom and back to bed with no complications. [employer] student Cassie at
bedside. Pt has no request at this time. Call light within reach.

## 2024-10-10 NOTE — NUR
Patient resting in bed. Rates pain at 5/10, prn pain meds given. Needs met.
Assessment complete. IV in right AC infusing without complications. Call light
and personal items in reach. Bed in low position and bed alarm on.

## 2024-10-10 NOTE — NUR
Patient arrived to room 351 with personal belongings at this time. Denies any
pain at this time. Needs met. Assessment and med rec complete to best ability.
IV in right AC infusing without complications. Oriented patient to room, call
light, bed, and bathroom. Call light and personal items in reach. Bed in low
position.

## 2024-10-10 NOTE — NUR
Hospitalist HOLA Olmos called for patient request of something to help her
sleep. Patient states she takes 2 tylenol PM and 40mg of melatonin at night to
help her sleep. Recieved orders for Unisom 25mg PO QHS, PRN.

## 2024-10-11 VITALS — SYSTOLIC BLOOD PRESSURE: 107 MMHG | HEART RATE: 70 BPM | DIASTOLIC BLOOD PRESSURE: 62 MMHG | TEMPERATURE: 97.7 F

## 2024-10-11 VITALS — DIASTOLIC BLOOD PRESSURE: 55 MMHG | SYSTOLIC BLOOD PRESSURE: 109 MMHG

## 2024-10-11 VITALS — HEART RATE: 60 BPM | DIASTOLIC BLOOD PRESSURE: 65 MMHG | SYSTOLIC BLOOD PRESSURE: 118 MMHG | TEMPERATURE: 97.5 F

## 2024-10-11 VITALS — SYSTOLIC BLOOD PRESSURE: 107 MMHG

## 2024-10-11 VITALS — SYSTOLIC BLOOD PRESSURE: 109 MMHG

## 2024-10-11 VITALS — SYSTOLIC BLOOD PRESSURE: 93 MMHG

## 2024-10-11 VITALS — DIASTOLIC BLOOD PRESSURE: 54 MMHG | TEMPERATURE: 97.5 F | SYSTOLIC BLOOD PRESSURE: 93 MMHG | HEART RATE: 61 BPM

## 2024-10-11 VITALS — SYSTOLIC BLOOD PRESSURE: 118 MMHG

## 2024-10-11 LAB
ANION GAP SERPL CALC-SCNC: 7 MMOL/L (ref 7–16)
BASOPHILS # BLD: 0 K/MM3 (ref 0–0.2)
BASOPHILS NFR BLD AUTO: 0.3 % (ref 0–2)
BUN SERPL-MCNC: 8 MG/DL (ref 10–20)
CALCIUM SERPL-MCNC: 8.8 MG/DL (ref 8.4–10.2)
CHLORIDE SERPL-SCNC: 113 MEQ/L (ref 98–107)
CREAT SERPL-SCNC: 0.8 MG/DL (ref 0.57–1.11)
EOSINOPHIL # BLD: 0.3 K/MM3 (ref 0–0.7)
EOSINOPHIL NFR BLD: 5 % (ref 0–4)
ERYTHROCYTE [DISTWIDTH] IN BLOOD BY AUTOMATED COUNT: 14.2 % (ref 11.5–14.5)
GLUCOSE SERPL-MCNC: 70 MG/DL (ref 70–99)
GRANULOCYTES # BLD AUTO: 50 % (ref 42.2–75.2)
HCT VFR BLD AUTO: 32.5 % (ref 37–47)
HGB BLD-MCNC: 10.6 G/DL (ref 12.5–16)
LYMPHOCYTES # BLD: 2.1 K/MM3 (ref 1.2–3.4)
LYMPHOCYTES NFR BLD: 33 % (ref 20–51)
MCH RBC QN AUTO: 31 PG (ref 27–31)
MCHC RBC AUTO-ENTMCNC: 33 G/DL (ref 33–37)
MCV RBC AUTO: 95 FL (ref 80–100)
MONOCYTES # BLD: 0.7 K/MM3 (ref 0.1–0.6)
MONOCYTES NFR BLD AUTO: 11.2 % (ref 1.7–9.3)
NEUTROPHILS # BLD: 3.2 K/MM3 (ref 1.4–6.5)
PLATELET # BLD AUTO: 179 K/MM3 (ref 130–400)
PMV BLD AUTO: 11.7 FL (ref 7.4–10.4)
POTASSIUM SERPL-SCNC: 4.1 MEQ/L (ref 3.5–4.5)
RBC # BLD AUTO: 3.41 M/MM3 (ref 4.1–5.3)
SODIUM SERPL-SCNC: 142 MEQ/L (ref 136–145)

## 2024-10-11 NOTE — NUR
PATIENT GIVEN DISCHARGE INSTRUCTIONS AND EDUCAITON. IV REMOVED. PATIENT VOICED
UNDERSTANDING OF HER DISCHARGE INSTRUCTIONS. PATIENT AWAITIGN RIDE TO ARRIVE
AT 1400.

## 2024-10-11 NOTE — NUR
AYLIN Sanders discussed discharge paperwork with pt and discontinued INT to Rt
AC. Pt is leaving facility with family back to home.

## 2024-10-11 NOTE — NUR
Pt awake in bed with no complaints. Shift assessment completed. VSS. Pt denies
pain rating 0/10. INT to Rt AC patent with no swelling, redness, or drainage.
Critical blood sugar of [address] Worthy. Apple juice provided to pt
and recheck blood sugar noted to be 103. No new orders received at this time.
Pt has no request. Call light within reach.

## 2024-10-11 NOTE — NUR
Bedside report received from AYLIN Enriquez. Pt resting in bed with eyes closed and
no complaints. Call light within reach.

## 2024-10-11 NOTE — NUR
met with patient who confirmed she would like Home Health
services from [facility]. HOLLY contacted Marily at [facility] and faxed
referral. Marily called back with acceptance so HOLLY Escobar faxed discharge orders
on HOLLY's behalf.
 
Discharge Plan: [facility]